# Patient Record
Sex: MALE | Race: WHITE | Employment: FULL TIME | URBAN - NONMETROPOLITAN AREA
[De-identification: names, ages, dates, MRNs, and addresses within clinical notes are randomized per-mention and may not be internally consistent; named-entity substitution may affect disease eponyms.]

---

## 2018-02-07 ENCOUNTER — HOSPITAL ENCOUNTER (EMERGENCY)
Age: 38
Discharge: HOME | End: 2018-02-07
Payer: COMMERCIAL

## 2018-02-07 VITALS
BODY MASS INDEX: 31.1 KG/M2 | OXYGEN SATURATION: 97 % | RESPIRATION RATE: 16 BRPM | DIASTOLIC BLOOD PRESSURE: 70 MMHG | TEMPERATURE: 97.9 F | HEIGHT: 69 IN | WEIGHT: 210 LBS | HEART RATE: 101 BPM | SYSTOLIC BLOOD PRESSURE: 146 MMHG

## 2018-02-07 DIAGNOSIS — H65.02 ACUTE SEROUS OTITIS MEDIA OF LEFT EAR, RECURRENCE NOT SPECIFIED: Primary | ICD-10-CM

## 2018-02-07 DIAGNOSIS — J32.9 RHINOSINUSITIS: ICD-10-CM

## 2018-02-07 DIAGNOSIS — J31.0 RHINOSINUSITIS: ICD-10-CM

## 2018-02-07 PROCEDURE — 99213 OFFICE O/P EST LOW 20 MIN: CPT

## 2018-02-07 PROCEDURE — 99214 OFFICE O/P EST MOD 30 MIN: CPT | Performed by: NURSE PRACTITIONER

## 2018-02-07 RX ORDER — IBUPROFEN 600 MG/1
400 TABLET ORAL EVERY 6 HOURS PRN
COMMUNITY

## 2018-02-07 RX ORDER — AMOXICILLIN 500 MG/1
500 CAPSULE ORAL 2 TIMES DAILY
Qty: 20 CAPSULE | Refills: 0 | Status: SHIPPED | OUTPATIENT
Start: 2018-02-07 | End: 2018-02-17

## 2018-02-07 ASSESSMENT — PAIN SCALES - GENERAL: PAINLEVEL_OUTOF10: 2

## 2018-02-07 ASSESSMENT — PAIN DESCRIPTION - LOCATION: LOCATION: EAR

## 2018-02-07 ASSESSMENT — PAIN DESCRIPTION - ORIENTATION: ORIENTATION: LEFT

## 2018-02-07 NOTE — ED PROVIDER NOTES
[facility]  Urgent Care Encounter      CHIEF COMPLAINT       Chief Complaint   Patient presents with    Otalgia     left    Cough    Nasal Congestion       Nurses Notes reviewed and I agree except as noted in the HPI. HISTORY OF PRESENT ILLNESS   Aleah Pa is a 40 y.o. male who presents:  Presents with nasal congestion, sinus pressure, cough- dry, left ear pain started yesterday. The pain is becoming more constant. It is a 2/10 currently, aching pain. Denies any drainage, swelling, erythema. Took 600 mg ibuprofen this morning with some relief for a few hours. The cough and congestion started on Friday. His son had similar symptoms. Past medical history of bronchitis, pneumonia, GERD. The history is provided by the patient. REVIEW OF SYSTEMS     Review of Systems   Constitutional: Negative for activity change, appetite change, chills, diaphoresis, fatigue, fever and unexpected weight change. HENT: Positive for congestion, ear pain and sinus pressure. Negative for dental problem, ear discharge, facial swelling, mouth sores, postnasal drip, rhinorrhea, sneezing, sore throat, tinnitus, trouble swallowing and voice change. Eyes: Negative for discharge, redness and itching. Respiratory: Positive for cough. Negative for chest tightness, shortness of breath and wheezing. Gastrointestinal: Negative for abdominal pain, blood in stool, diarrhea, nausea and vomiting. Musculoskeletal: Negative for myalgias. Skin: Negative for pallor and rash. Neurological: Negative for dizziness, light-headedness and headaches. Hematological: Negative for adenopathy. PAST MEDICAL HISTORY         Diagnosis Date    Heart burn     Pneumonia        SURGICAL HISTORY     Patient  has a past surgical history that includes Tonsillectomy and Cardiac surgery.     CURRENT MEDICATIONS       Discharge Medication List as of 2/7/2018  4:21 PM      CONTINUE these medications which have No murmur heard. Pulmonary/Chest: Effort normal and breath sounds normal. No accessory muscle usage or stridor. No respiratory distress. He has no decreased breath sounds. He has no wheezes. He has no rhonchi. He has no rales. He exhibits no tenderness. Lymphadenopathy:     He has no cervical adenopathy. Neurological: He is alert and oriented to person, place, and time. Skin: Skin is warm, dry and intact. No rash noted. He is not diaphoretic. No cyanosis. No pallor. Nursing note and vitals reviewed. DIAGNOSTIC RESULTS   Labs:No results found for this visit on 02/07/18. IMAGING:    URGENT CARE COURSE:     Vitals:    02/07/18 1547   BP: (!) 146/70   Pulse: 101   Resp: 16   Temp: 97.9 °F (36.6 °C)   SpO2: 97%   Weight: 210 lb (95.3 kg)   Height: 5' 9\" (1.753 m)       Medications - No data to display  PROCEDURES:  None  FINAL IMPRESSION      1. Acute serous otitis media of left ear, recurrence not specified    2. Rhinosinusitis        DISPOSITION/PLAN   DISPOSITION Decision To Discharge 02/07/2018 04:16:40 PM  Amoxicillin as prescribed for acute Lt AOM without rupture  Keep water out of ears, no QTIPS  Sudafed as directed  Over counter ibuprofen for pain, fever  Push fluids    PATIENT REFERRED TO:  [place] [address]  [phone]  Call       Patient instructed to follow up with PCP. If symptoms worsen, become severe or new symptoms develop patient instructed to go to the emergency room immediately.     DISCHARGE MEDICATIONS:  Discharge Medication List as of 2/7/2018  4:21 PM      START taking these medications    Details   amoxicillin (AMOXIL) 500 MG capsule Take 1 capsule by mouth 2 times daily for 10 days, Disp-20 capsule, R-0Normal      pseudoephedrine (SUDAFED 24 HOUR NON-DROWSY) 240 MG TB24 extended release tablet Take 1 tablet by mouth daily as needed (congestion) Daily for nasal congestion, Disp-10 tablet, R-0Normal           Discharge Medication List as of 2/7/2018  4:21 PM          Patient given educational materials - see patient instructions. Discussed use, benefit, and side effects of prescribed medications. All patient questions answered. Pt voiced understanding. Reviewed health maintenance. Patient agreed with treatment plan. Follow up as directed.      Reshma Patel, [address] [place] [address], [facility]  02/09/18 0590

## 2018-02-09 ASSESSMENT — ENCOUNTER SYMPTOMS
BLOOD IN STOOL: 0
CHEST TIGHTNESS: 0
EYE ITCHING: 0
TROUBLE SWALLOWING: 0
VOICE CHANGE: 0
ABDOMINAL PAIN: 0
VOMITING: 0
FACIAL SWELLING: 0
EYE REDNESS: 0
COUGH: 1
EYE DISCHARGE: 0
DIARRHEA: 0
WHEEZING: 0
NAUSEA: 0
SINUS PRESSURE: 1
SHORTNESS OF BREATH: 0
SORE THROAT: 0
RHINORRHEA: 0

## 2018-02-12 ENCOUNTER — HOSPITAL ENCOUNTER (EMERGENCY)
Age: 38
Discharge: HOME | End: 2018-02-12
Payer: COMMERCIAL

## 2018-02-12 VITALS
WEIGHT: 210 LBS | SYSTOLIC BLOOD PRESSURE: 118 MMHG | OXYGEN SATURATION: 99 % | HEIGHT: 69 IN | DIASTOLIC BLOOD PRESSURE: 69 MMHG | BODY MASS INDEX: 31.1 KG/M2 | RESPIRATION RATE: 20 BRPM | TEMPERATURE: 99.7 F | HEART RATE: 123 BPM

## 2018-02-12 DIAGNOSIS — J10.1 INFLUENZA A: Primary | ICD-10-CM

## 2018-02-12 LAB
FLU A ANTIGEN: POSITIVE
INFLUENZA B AG, EIA: NEGATIVE

## 2018-02-12 PROCEDURE — 99214 OFFICE O/P EST MOD 30 MIN: CPT

## 2018-02-12 PROCEDURE — 87804 INFLUENZA ASSAY W/OPTIC: CPT

## 2018-02-12 PROCEDURE — 99213 OFFICE O/P EST LOW 20 MIN: CPT | Performed by: NURSE PRACTITIONER

## 2018-02-12 RX ORDER — OSELTAMIVIR PHOSPHATE 75 MG/1
75 CAPSULE ORAL 2 TIMES DAILY
Qty: 10 CAPSULE | Refills: 0 | Status: SHIPPED | OUTPATIENT
Start: 2018-02-12 | End: 2018-02-17

## 2018-02-12 RX ORDER — BENZONATATE 200 MG/1
200 CAPSULE ORAL 3 TIMES DAILY PRN
Qty: 21 CAPSULE | Refills: 0 | Status: SHIPPED | OUTPATIENT
Start: 2018-02-12 | End: 2018-02-19

## 2018-02-12 ASSESSMENT — ENCOUNTER SYMPTOMS
DIARRHEA: 0
CHEST TIGHTNESS: 0
VOMITING: 0
COUGH: 1
NAUSEA: 0
ABDOMINAL PAIN: 0
SORE THROAT: 0
SINUS PRESSURE: 0
SHORTNESS OF BREATH: 0
RHINORRHEA: 0

## 2018-02-12 ASSESSMENT — PAIN SCALES - GENERAL: PAINLEVEL_OUTOF10: 7

## 2018-02-12 NOTE — ED PROVIDER NOTES
[facility]  Urgent Care Encounter      CHIEF COMPLAINT       Chief Complaint   Patient presents with    Cough     chills, body aches       Nurses Notes reviewed and I agree except as noted in the HPI. HISTORY OF PRESENT ILLNESS   Lai Henley is a 40 y.o. male who presents to the urgent care for evaluation of chills, stating that he cannot get warm, fever, feeling extremely tired, cough, and muscle aches. He reports that he was seen here in the urgent care for a simus infection last week and is still taking the antibiotics as directed. He did not have the influenza vaccination and is not interested in receiving it. He denies sick contacts. REVIEW OF SYSTEMS     Review of Systems   Constitutional: Positive for chills, fatigue and fever. Negative for activity change and appetite change. HENT: Negative for congestion, ear pain, postnasal drip, rhinorrhea, sinus pressure and sore throat. Respiratory: Positive for cough. Negative for chest tightness and shortness of breath. Cardiovascular: Negative for chest pain. Gastrointestinal: Negative for abdominal pain, diarrhea, nausea and vomiting. Musculoskeletal: Positive for myalgias. Skin: Negative for rash. Allergic/Immunologic: Negative for environmental allergies and food allergies. Neurological: Negative for headaches. PAST MEDICAL HISTORY         Diagnosis Date    Heart burn     Pneumonia        SURGICAL HISTORY     Patient  has a past surgical history that includes Tonsillectomy; Cardiac surgery; and Foot surgery (2015).     CURRENT MEDICATIONS       Previous Medications    ALBUTEROL SULFATE  (90 BASE) MCG/ACT INHALER    Inhale 2 puffs into the lungs every 4 hours as needed for Wheezing or Shortness of Breath    AMOXICILLIN (AMOXIL) 500 MG CAPSULE    Take 1 capsule by mouth 2 times daily for 10 days    IBUPROFEN (ADVIL;MOTRIN) 600 MG TABLET    Take 600 mg by mouth every 6 hours as needed for Pain

## 2018-02-14 ENCOUNTER — NURSE TRIAGE (OUTPATIENT)
Dept: ADMINISTRATIVE | Age: 38
End: 2018-02-14

## 2020-01-08 ENCOUNTER — HOSPITAL ENCOUNTER (EMERGENCY)
Age: 40
Discharge: HOME | End: 2020-01-08
Payer: COMMERCIAL

## 2020-01-08 VITALS
WEIGHT: 209 LBS | OXYGEN SATURATION: 97 % | SYSTOLIC BLOOD PRESSURE: 150 MMHG | BODY MASS INDEX: 30.96 KG/M2 | HEIGHT: 69 IN | RESPIRATION RATE: 18 BRPM | DIASTOLIC BLOOD PRESSURE: 79 MMHG | TEMPERATURE: 99.2 F | HEART RATE: 112 BPM

## 2020-01-08 LAB
FLU A ANTIGEN: NEGATIVE
INFLUENZA B AG, EIA: NEGATIVE

## 2020-01-08 PROCEDURE — 99213 OFFICE O/P EST LOW 20 MIN: CPT | Performed by: NURSE PRACTITIONER

## 2020-01-08 PROCEDURE — 87804 INFLUENZA ASSAY W/OPTIC: CPT

## 2020-01-08 PROCEDURE — 99213 OFFICE O/P EST LOW 20 MIN: CPT

## 2020-01-08 RX ORDER — ASCORBIC ACID 500 MG
TABLET ORAL
Refills: 0 | COMMUNITY
Start: 2020-01-08

## 2020-01-08 RX ORDER — DEXTROMETHORPHAN HYDROBROMIDE AND PROMETHAZINE HYDROCHLORIDE 15; 6.25 MG/5ML; MG/5ML
5 SYRUP ORAL 4 TIMES DAILY PRN
Qty: 120 ML | Refills: 0 | Status: SHIPPED | OUTPATIENT
Start: 2020-01-08 | End: 2020-01-15

## 2020-01-08 RX ORDER — MULTIVIT-MIN/IRON FUM/FOLIC AC 7.5 MG-4
1 TABLET ORAL DAILY
Refills: 0 | COMMUNITY
Start: 2020-01-08 | End: 2021-05-17

## 2020-01-08 RX ORDER — MELATONIN
Refills: 0 | COMMUNITY
Start: 2020-01-08 | End: 2021-05-17

## 2020-01-08 ASSESSMENT — ENCOUNTER SYMPTOMS
SINUS PRESSURE: 1
SORE THROAT: 0
EYE REDNESS: 0
COUGH: 0
VOMITING: 0
DIARRHEA: 0
ABDOMINAL PAIN: 0
NAUSEA: 0
EYE ITCHING: 0

## 2020-01-08 ASSESSMENT — PAIN SCALES - GENERAL: PAINLEVEL_OUTOF10: 4

## 2020-01-08 ASSESSMENT — PAIN DESCRIPTION - PAIN TYPE: TYPE: ACUTE PAIN

## 2020-01-08 ASSESSMENT — PAIN DESCRIPTION - DESCRIPTORS: DESCRIPTORS: ACHING

## 2020-01-08 ASSESSMENT — PAIN DESCRIPTION - FREQUENCY: FREQUENCY: CONTINUOUS

## 2020-01-08 ASSESSMENT — PAIN DESCRIPTION - LOCATION: LOCATION: EAR;GENERALIZED

## 2020-01-08 NOTE — ED TRIAGE NOTES
Pt ambulatory into esuc with c/o sinus drainage, right ear pain with generalized body aches for the past day. Pt states pain 4.

## 2020-01-08 NOTE — ED PROVIDER NOTES
Flu A Antigen NEGATIVE NEGATIVE    Influenza B Ag, EIA NEGATIVE NEGATIVE       IMAGING:  No orders to display     URGENT CARE COURSE:     Vitals:    01/08/20 1333   BP: (!) 150/79   Pulse: 112   Resp: 18   Temp: 99.2 °F (37.3 °C)   TempSrc: Temporal   SpO2: 97%   Weight: 209 lb (94.8 kg)   Height: 5' 9\" (1.753 m)       Medications - No data to display  PROCEDURES:  FINALIMPRESSION      1. Common cold        DISPOSITION/PLAN   DISPOSITION    Discharge    ED Course as of Jan 08 1403 Wed Jan 08, 2020   1401 Flu A Antigen: NEGATIVE [HA]   1401 Influenza B Ag, EIA: NEGATIVE [HA]      ED Course User Index  [DAVIS] YURIDIA Metzger - CNP     Symptoms appear to be viral in nature at this time. Physical assessment findings, diagnostic testing(s) if applicable, and vital signs reviewed with patient/patient representative. Questions answered. If applicable, patient/patient representative will be contacted upon receipt of final culture and sensitivity or other testing results when available. Any additions or changes to medications or changes the plan of care will be made at that time. Medications as directed, including OTC medications for supportive care. Education provided on medications. Differential diagnosis(s) discussed with patient/patient representative. Home care/self care instructions reviewed with patient/patient representative. Patient is to follow-up with family care provider in 2-3 days if no improvement. Patient is to go to the emergency department if symptoms worsen. Patient/patient representative is aware of care plan, questions answered, verbalizes understanding and is in agreement. Teach back method used for patient/patient representative teaching(s) and printed instructions attached to after visit summary.       Problem List Items Addressed This Visit     None      Visit Diagnoses     Common cold    -  Primary    Relevant Medications    promethazine-dextromethorphan (PROMETHAZINE-DM) 6.25-15 MG/5ML syrup          PATIENT REFERRED TO:  [address]  [address]  [facility]  [phone]    As needed, If symptoms worsen, GO DIRECTLY TO [address] [place] [address]  [address] [address] [facility] [address] [facility]  Schedule an appointment as soon as possible for a visit in 3 days  if you do not have a family provider      Marina Cabral, [address], APRN - CNP  01/08/20 9274

## 2021-05-17 ENCOUNTER — HOSPITAL ENCOUNTER (EMERGENCY)
Age: 41
Discharge: HOME | End: 2021-05-17
Payer: COMMERCIAL

## 2021-05-17 VITALS
HEART RATE: 100 BPM | HEIGHT: 69 IN | TEMPERATURE: 97.1 F | OXYGEN SATURATION: 96 % | WEIGHT: 205 LBS | RESPIRATION RATE: 14 BRPM | SYSTOLIC BLOOD PRESSURE: 132 MMHG | BODY MASS INDEX: 30.36 KG/M2 | DIASTOLIC BLOOD PRESSURE: 82 MMHG

## 2021-05-17 DIAGNOSIS — J06.9 ACUTE UPPER RESPIRATORY INFECTION: Primary | ICD-10-CM

## 2021-05-17 LAB
FLU A ANTIGEN: NEGATIVE
INFLUENZA B AG, EIA: NEGATIVE

## 2021-05-17 PROCEDURE — 87804 INFLUENZA ASSAY W/OPTIC: CPT

## 2021-05-17 PROCEDURE — 99213 OFFICE O/P EST LOW 20 MIN: CPT | Performed by: NURSE PRACTITIONER

## 2021-05-17 PROCEDURE — 99213 OFFICE O/P EST LOW 20 MIN: CPT

## 2021-05-17 ASSESSMENT — PAIN SCALES - GENERAL: PAINLEVEL_OUTOF10: 2

## 2021-05-17 ASSESSMENT — ENCOUNTER SYMPTOMS
SORE THROAT: 0
NAUSEA: 0
ABDOMINAL PAIN: 0
STRIDOR: 0
APNEA: 0
WHEEZING: 0
VOMITING: 0
COUGH: 0
SHORTNESS OF BREATH: 0
DIARRHEA: 0
CHEST TIGHTNESS: 0
CHOKING: 0

## 2021-05-17 ASSESSMENT — PAIN DESCRIPTION - DESCRIPTORS: DESCRIPTORS: ACHING

## 2021-05-17 NOTE — ED TRIAGE NOTES
To room 2 c/o fever and aches.   Work had ' Me  Tested for covid through [facility]. My wife wants me checked for flu\"

## 2021-05-17 NOTE — ED PROVIDER NOTES
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 HISTORY OFPRESENT ILLNESS   Darryn Fierro is a 39 y.o. The history is provided by the patient. No  was used. Fever  Max temp prior to arrival:  102  Temp source:  Oral  Severity:  Severe  Onset quality:  Sudden  Duration:  3 days  Timing:  Constant  Progression:  Improving  Relieved by:  Nothing  Worsened by:  Nothing  Ineffective treatments:  Ibuprofen, aspirin and acetaminophen  Associated symptoms: chills and myalgias    Associated symptoms: no chest pain, no confusion, no congestion, no cough, no diarrhea, no dysuria, no ear pain, no headaches, no nausea, no rash, no somnolence, no sore throat and no vomiting    Risk factors: no contaminated food, no contaminated water, no hx of cancer, no immunosuppression, no occupational exposure, no recent sickness, no recent travel and no sick contacts        REVIEW OF SYSTEMS     Review of Systems   Constitutional: Positive for activity change, appetite change, chills, fatigue and fever. Negative for diaphoresis. HENT: Negative for congestion, ear pain and sore throat. Respiratory: Negative for apnea, cough, choking, chest tightness, shortness of breath, wheezing and stridor. Cardiovascular: Negative for chest pain, palpitations and leg swelling. Gastrointestinal: Negative for abdominal pain, diarrhea, nausea and vomiting. Genitourinary: Negative for dysuria. Musculoskeletal: Positive for myalgias. Skin: Negative for rash. Neurological: Negative for dizziness, light-headedness and headaches. Psychiatric/Behavioral: Negative for confusion.        PAST MEDICAL HISTORY         Diagnosis Date    Heart burn     Pneumonia        SURGICAL HISTORY     Patient  has a past surgical history that includes Tonsillectomy; Cardiac surgery; and Foot surgery (2015). CURRENT MEDICATIONS       Discharge Medication List as of 5/17/2021 10:31 AM      CONTINUE these medications which have NOT CHANGED    Details   vitamin C (ASCORBIC ACID) 500 MG tablet Take as directed on package. , R-0OTC      ibuprofen (ADVIL;MOTRIN) 600 MG tablet Take 400 mg by mouth every 6 hours as needed for Pain Historical Med      omeprazole (PRILOSEC) 20 MG capsule Take 20 mg by mouth daily      albuterol sulfate  (90 BASE) MCG/ACT inhaler Inhale 2 puffs into the lungs every 4 hours as needed for Wheezing or Shortness of Breath, Disp-1 Inhaler, R-0             ALLERGIES     Patient is has No Known Allergies. FAMILY HISTORY     Patient's family history includes Heart Disease in his father. SOCIAL HISTORY     Patient  reports that he has quit smoking. His smokeless tobacco use includes chew. He reports current alcohol use. He reports that he does not use drugs. PHYSICAL EXAM     ED TRIAGE VITALS  BP: 132/82, Temp: 97.1 °F (36.2 °C), Pulse: 100, Resp: 14, SpO2: 96 %  Physical Exam  Vitals and nursing note reviewed. Constitutional:       General: He is not in acute distress. Appearance: Normal appearance. He is normal weight. He is not ill-appearing, toxic-appearing or diaphoretic. HENT:      Head: Normocephalic and atraumatic. Right Ear: Tympanic membrane, ear canal and external ear normal.      Left Ear: Tympanic membrane, ear canal and external ear normal.      Nose: Nose normal.      Mouth/Throat:      Mouth: Mucous membranes are moist.   Eyes:      Extraocular Movements: Extraocular movements intact. Conjunctiva/sclera: Conjunctivae normal.   Pulmonary:      Effort: Pulmonary effort is normal.   Musculoskeletal:         General: Normal range of motion. Cervical back: Normal range of motion. Skin:     General: Skin is warm.    Neurological:      General: No focal deficit present. Mental Status: He is alert and oriented to person, place, and time. Psychiatric:         Mood and Affect: Mood normal.         Behavior: Behavior normal.         Thought Content: Thought content normal.         Judgment: Judgment normal.         DIAGNOSTIC RESULTS   Labs:  Results for orders placed or performed during the hospital encounter of 05/17/21   Rapid influenza A/B antigens   Result Value Ref Range    Flu A Antigen Negative NEGATIVE    Influenza B Ag, EIA Negative NEGATIVE       IMAGING:  No orders to display     URGENT CARE COURSE:     Vitals:    05/17/21 1001   BP: 132/82   Pulse: 100   Resp: 14   Temp: 97.1 °F (36.2 °C)   SpO2: 96%   Weight: 205 lb (93 kg)   Height: 5' 9\" (1.753 m)       Medications - No data to display  PROCEDURES:  None  FINAL IMPRESSION      1. Acute upper respiratory infection        DISPOSITION/PLAN   Decision To Discharge     The patient was advised to drink plenty of fluids. They may take Tylenol for fever or body aches. Take prescribed medication as directed. They may also take OTC antihistamines/ decongestant as needed. Pt is advised to go to ER if symptoms worsen, new symptoms develop, high fever >102, vomiting, breathing difficulty, lethargy, chest pain or shortness of breath Dial 911. The patient or patient's representative is agreeable to the treatment plan they're advised to follow-up with her primary care provider in one week for reevaluation. Patient is to self isolate until COVID-19 test result is known. If applicable, work/school note has been provided to be off until COVID-19 test results. Follow with family provider for further work/school note needs. May take over-the-counter supplements daily if no contraindications:  Multivitamin/mineral, vitamin C, vitamin D, and B Complex as directed on bottles.     PATIENT REFERRED TO:  [address] [address]  Call today  [phone] for appointment    DISCHARGE MEDICATIONS:  Discharge Medication List as of 5/17/2021 10:31 AM        Discharge Medication List as of 5/17/2021 10:31 AM          YURIDIA Carson CNP, APRN - CNP  05/17/21 1038

## 2021-12-22 ENCOUNTER — HOSPITAL ENCOUNTER (EMERGENCY)
Age: 41
Discharge: HOME | End: 2021-12-22
Payer: COMMERCIAL

## 2021-12-22 VITALS
OXYGEN SATURATION: 97 % | RESPIRATION RATE: 18 BRPM | SYSTOLIC BLOOD PRESSURE: 154 MMHG | TEMPERATURE: 98.6 F | HEART RATE: 115 BPM | DIASTOLIC BLOOD PRESSURE: 74 MMHG

## 2021-12-22 DIAGNOSIS — J32.9 RHINOSINUSITIS: Primary | ICD-10-CM

## 2021-12-22 DIAGNOSIS — J31.0 RHINOSINUSITIS: Primary | ICD-10-CM

## 2021-12-22 LAB
FLU A ANTIGEN: NEGATIVE
INFLUENZA B AG, EIA: NEGATIVE

## 2021-12-22 PROCEDURE — 87804 INFLUENZA ASSAY W/OPTIC: CPT

## 2021-12-22 PROCEDURE — 99213 OFFICE O/P EST LOW 20 MIN: CPT

## 2021-12-22 PROCEDURE — 99213 OFFICE O/P EST LOW 20 MIN: CPT | Performed by: EMERGENCY MEDICINE

## 2021-12-22 RX ORDER — AMOXICILLIN AND CLAVULANATE POTASSIUM 875; 125 MG/1; MG/1
1 TABLET, FILM COATED ORAL 2 TIMES DAILY
Qty: 20 TABLET | Refills: 0 | Status: SHIPPED | OUTPATIENT
Start: 2021-12-22 | End: 2022-01-01

## 2021-12-22 ASSESSMENT — ENCOUNTER SYMPTOMS
CHEST TIGHTNESS: 1
VOMITING: 0
NAUSEA: 0
SINUS PRESSURE: 0
SINUS PAIN: 0
SORE THROAT: 1
RHINORRHEA: 1
COLOR CHANGE: 0
COUGH: 1
SHORTNESS OF BREATH: 0

## 2021-12-22 NOTE — ED PROVIDER NOTES
[facility]  Urgent Care Encounter       CHIEF COMPLAINT       Chief Complaint   Patient presents with    Generalized Body Aches    Fever       Nurses Notes reviewed and I agree except as noted in the HPI. HISTORY OF PRESENT ILLNESS   Mikael Whitfield is a 39 y.o. male who presents for body aches, chills, cough, chest congestion. Patient reports 102 fever 2 nights ago. Patient symptoms began 3-1/2 days ago. Patient states he was in [facility] and went to [facility]. He states there is a lot of little children running around. He thinks he may have been exposed to his illness there. Patient has had an outpatient Covid test and this was negative. Patient states he is starting to feel better over the past 3 to 4 hours. He is not short of breath currently. He states he has a tightness in the chest but no chest pain. HPI    REVIEW OF SYSTEMS     Review of Systems   Constitutional: Positive for chills, fatigue and fever. HENT: Positive for congestion, rhinorrhea and sore throat. Negative for sinus pressure and sinus pain. Respiratory: Positive for cough and chest tightness. Negative for shortness of breath. Cardiovascular: Negative for chest pain. Gastrointestinal: Negative for nausea and vomiting. Musculoskeletal: Positive for arthralgias. Skin: Negative for color change. Neurological: Positive for headaches. Negative for dizziness. Psychiatric/Behavioral: Negative for behavioral problems. PAST MEDICAL HISTORY         Diagnosis Date    Heart burn     Pneumonia        SURGICALHISTORY     Patient  has a past surgical history that includes Tonsillectomy; Cardiac surgery; and Foot surgery (2015). CURRENT MEDICATIONS       Discharge Medication List as of 12/22/2021  4:43 PM      CONTINUE these medications which have NOT CHANGED    Details   vitamin C (ASCORBIC ACID) 500 MG tablet Take as directed on package. , R-0OTC      ibuprofen (ADVIL;MOTRIN) 600 MG tablet Take 400 mg by mouth every 6 hours as needed for Pain Historical Med      albuterol sulfate  (90 BASE) MCG/ACT inhaler Inhale 2 puffs into the lungs every 4 hours as needed for Wheezing or Shortness of Breath, Disp-1 Inhaler, R-0      omeprazole (PRILOSEC) 20 MG capsule Take 20 mg by mouth daily             ALLERGIES     Patient is has No Known Allergies. Patients   There is no immunization history on file for this patient. FAMILY HISTORY     Patient's family history includes Heart Disease in his father. SOCIAL HISTORY     Patient  reports that he has quit smoking. His smokeless tobacco use includes chew. He reports current alcohol use. He reports that he does not use drugs. PHYSICAL EXAM     ED TRIAGE VITALS  BP: (!) 154/74, Temp: 98.6 °F (37 °C), Pulse: 115, Resp: 18, SpO2: 97 %,Estimated body mass index is 30.27 kg/m² as calculated from the following:    Height as of 5/17/21: 5' 9\" (1.753 m). Weight as of 5/17/21: 205 lb (93 kg). ,No LMP for male patient. Physical Exam  Constitutional:       Appearance: He is ill-appearing. He is not toxic-appearing. HENT:      Head: Normocephalic. Nose: Rhinorrhea present. Mouth/Throat:      Mouth: Mucous membranes are moist.   Cardiovascular:      Rate and Rhythm: Tachycardia present. Pulses: Normal pulses. Heart sounds: Normal heart sounds. Pulmonary:      Effort: Pulmonary effort is normal.      Breath sounds: Normal breath sounds. Abdominal:      General: Abdomen is flat. Bowel sounds are normal. There is no distension. Tenderness: There is no abdominal tenderness. Skin:     General: Skin is warm and dry. Capillary Refill: Capillary refill takes less than 2 seconds. Neurological:      General: No focal deficit present. Mental Status: He is alert.    Psychiatric:         Mood and Affect: Mood normal.         Behavior: Behavior normal.         DIAGNOSTIC RESULTS     Labs:  Results for orders placed or performed during the hospital encounter of 12/22/21   Rapid influenza A/B antigens   Result Value Ref Range    Flu A Antigen Negative NEGATIVE    Influenza B Ag, EIA Negative NEGATIVE       IMAGING:    No orders to display         EKG:      URGENT CARE COURSE:     Vitals:    12/22/21 1607   BP: (!) 154/74   Pulse: 115   Resp: 18   Temp: 98.6 °F (37 °C)   TempSrc: Oral   SpO2: 97%       Medications - No data to display         PROCEDURES:  None    FINAL IMPRESSION      1. Rhinosinusitis          DISPOSITION/ PLAN   Patient presents for what is likely rhinosinusitis. Patient also has cough likely from postnasal drip. Outpatient Covid and today's influenza testing are negative. We will treat with Augmentin. Advise drink plenty of fluids. Tylenol/ibuprofen as needed. Return for worsening headache, temperature 100.5 or greater, worsening cough, shortness of breath, chest pain, new concerns. PATIENT REFERRED TO:  No primary care provider on file. No primary physician on file.       DISCHARGE MEDICATIONS:  Discharge Medication List as of 12/22/2021  4:43 PM      START taking these medications    Details   amoxicillin-clavulanate (AUGMENTIN) 875-125 MG per tablet Take 1 tablet by mouth 2 times daily for 10 days, Disp-20 tablet, R-0Normal             Discharge Medication List as of 12/22/2021  4:43 PM      STOP taking these medications       Multiple Vitamins-Minerals (MULTIVITAMIN WITH MINERALS) tablet Comments:   Reason for Stopping:         Cholecalciferol (VITAMIN D3) 25 MCG (1000 UT) TABS Comments:   Reason for Stopping:               Discharge Medication List as of 12/22/2021  4:43 PM          YURIDIA Jacobo CNP    (Please note that portions of this note were completed with a voice recognition program. Efforts were made to edit the dictations but occasionally words are mis-transcribed.)          YURIDIA Jacobo CNP  12/22/21 7559

## 2024-12-30 ENCOUNTER — HOSPITAL ENCOUNTER (EMERGENCY)
Age: 44
Discharge: HOME | End: 2024-12-30
Payer: COMMERCIAL

## 2024-12-30 VITALS
OXYGEN SATURATION: 96 % | SYSTOLIC BLOOD PRESSURE: 119 MMHG | HEIGHT: 69 IN | BODY MASS INDEX: 31.1 KG/M2 | DIASTOLIC BLOOD PRESSURE: 75 MMHG | HEART RATE: 88 BPM | TEMPERATURE: 97.9 F | WEIGHT: 210 LBS | RESPIRATION RATE: 16 BRPM

## 2024-12-30 DIAGNOSIS — J06.9 VIRAL URI WITH COUGH: Primary | ICD-10-CM

## 2024-12-30 DIAGNOSIS — H10.023 PINK EYE DISEASE OF BOTH EYES: ICD-10-CM

## 2024-12-30 PROCEDURE — 99203 OFFICE O/P NEW LOW 30 MIN: CPT

## 2024-12-30 PROCEDURE — 99213 OFFICE O/P EST LOW 20 MIN: CPT | Performed by: NURSE PRACTITIONER

## 2024-12-30 RX ORDER — PREDNISONE 20 MG/1
20 TABLET ORAL 2 TIMES DAILY
Qty: 10 TABLET | Refills: 0 | Status: SHIPPED | OUTPATIENT
Start: 2024-12-30 | End: 2025-01-04

## 2024-12-30 RX ORDER — POLYMYXIN B SULFATE AND TRIMETHOPRIM 1; 10000 MG/ML; [USP'U]/ML
1 SOLUTION OPHTHALMIC EVERY 4 HOURS
Qty: 10 ML | Refills: 0 | Status: SHIPPED | OUTPATIENT
Start: 2024-12-30 | End: 2025-01-06

## 2024-12-30 RX ORDER — BROMPHENIRAMINE MALEATE, PSEUDOEPHEDRINE HYDROCHLORIDE, AND DEXTROMETHORPHAN HYDROBROMIDE 2; 30; 10 MG/5ML; MG/5ML; MG/5ML
10 SYRUP ORAL 4 TIMES DAILY PRN
Qty: 118 ML | Refills: 0 | Status: SHIPPED | OUTPATIENT
Start: 2024-12-30

## 2024-12-30 ASSESSMENT — LIFESTYLE VARIABLES
HOW MANY STANDARD DRINKS CONTAINING ALCOHOL DO YOU HAVE ON A TYPICAL DAY: PATIENT DOES NOT DRINK
HOW OFTEN DO YOU HAVE A DRINK CONTAINING ALCOHOL: NEVER

## 2024-12-30 ASSESSMENT — ENCOUNTER SYMPTOMS
EYE ITCHING: 1
EYE DISCHARGE: 1
EYE REDNESS: 1
COUGH: 1

## 2024-12-30 ASSESSMENT — PAIN - FUNCTIONAL ASSESSMENT: PAIN_FUNCTIONAL_ASSESSMENT: NONE - DENIES PAIN

## 2024-12-30 NOTE — ED PROVIDER NOTES
[facility]  UrgentCare Encounter      CHIEFCOMPLAINT       Chief Complaint   Patient presents with    Fever    Cough    Eye Problem     bilateral       Nurses Notes reviewed and I agree except as noted in the HPI.  HISTORY OF PRESENT ILLNESS     José Olivares is a 44 y.o. male who presents to the urgent care for evaluation.  The history is provided by the patient.   Cold Symptoms  Presenting symptoms: congestion, cough and fever    Duration:  2 days  Relieved by:  OTC medications (Mucinex helping)  Risk factors: sick contacts          The patient/patient representative has no other acute complaints at this time.    REVIEW OF SYSTEMS     Review of Systems   Constitutional:  Positive for fever.   HENT:  Positive for congestion.    Eyes:  Positive for discharge (matted shut this morning), redness and itching.   Respiratory:  Positive for cough.    All other systems reviewed and are negative.      PAST MEDICAL HISTORY         Diagnosis Date    Heart burn     Pneumonia        SURGICAL HISTORY     Patient  has a past surgical history that includes Tonsillectomy; Cardiac surgery; and Foot surgery (2015).    CURRENT MEDICATIONS       Previous Medications    ALBUTEROL SULFATE  (90 BASE) MCG/ACT INHALER    Inhale 2 puffs into the lungs every 4 hours as needed for Wheezing or Shortness of Breath    IBUPROFEN (ADVIL;MOTRIN) 600 MG TABLET    Take 400 mg by mouth every 6 hours as needed for Pain     OMEPRAZOLE (PRILOSEC) 20 MG CAPSULE    Take 1 capsule by mouth daily    VITAMIN C (ASCORBIC ACID) 500 MG TABLET    Take as directed on package.       ALLERGIES     Patient is has No Known Allergies.    FAMILY HISTORY     Patient'sfamily history includes Heart Disease in his father.    SOCIAL HISTORY     Patient  reports that he has quit smoking. His smokeless tobacco use includes chew. He reports current alcohol use. He reports that he does not use drugs.    PHYSICAL EXAM     ED TRIAGE VITALS  BP: